# Patient Record
Sex: FEMALE | Employment: FULL TIME | URBAN - METROPOLITAN AREA
[De-identification: names, ages, dates, MRNs, and addresses within clinical notes are randomized per-mention and may not be internally consistent; named-entity substitution may affect disease eponyms.]

---

## 2024-03-02 ENCOUNTER — APPOINTMENT (EMERGENCY)
Dept: CT IMAGING | Facility: HOSPITAL | Age: 24
End: 2024-03-02
Payer: COMMERCIAL

## 2024-03-02 ENCOUNTER — HOSPITAL ENCOUNTER (EMERGENCY)
Facility: HOSPITAL | Age: 24
Discharge: HOME | End: 2024-03-02
Attending: EMERGENCY MEDICINE
Payer: COMMERCIAL

## 2024-03-02 VITALS
OXYGEN SATURATION: 100 % | RESPIRATION RATE: 18 BRPM | DIASTOLIC BLOOD PRESSURE: 65 MMHG | HEART RATE: 75 BPM | SYSTOLIC BLOOD PRESSURE: 114 MMHG
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
## 2024-03-02 DIAGNOSIS — S09.90XA MINOR HEAD INJURY, INITIAL ENCOUNTER: ICD-10-CM

## 2024-03-02 DIAGNOSIS — S16.1XXA STRAIN OF NECK MUSCLE, INITIAL ENCOUNTER: ICD-10-CM

## 2024-03-02 PROCEDURE — 72125 CT NECK SPINE W/O DYE: CPT

## 2024-03-02 PROCEDURE — 96372 THER/PROPH/DIAG INJ SC/IM: CPT

## 2024-03-02 PROCEDURE — 99284 EMERGENCY DEPT VISIT MOD MDM: CPT

## 2024-03-02 RX ORDER — NAPROXEN 500 MG/1
500 TABLET ORAL EVERY 12 HOURS PRN
Qty: 15 TABLET | Refills: 0 | Status: SHIPPED | OUTPATIENT
Start: 2024-03-02

## 2024-03-02 RX ORDER — KETOROLAC TROMETHAMINE 30 MG/ML
30 INJECTION, SOLUTION INTRAMUSCULAR; INTRAVENOUS ONCE
Status: COMPLETED | OUTPATIENT
Start: 2024-03-02 | End: 2024-03-02

## 2024-03-02 RX ADMIN — KETOROLAC TROMETHAMINE 30 MG: 30 INJECTION, SOLUTION INTRAMUSCULAR; INTRAVENOUS at 15:54
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
## 2024-03-02 NOTE — Clinical Note
Tamera Ely was seen and treated in our emergency department on 3/2/2024.                Diagnosis:     Tamera  .    She may return on this date: 03/04/2024         If you have any questions or concerns, please don't hesitate to call.      Nargis Nazario, JUAN DAVID    ______________________________           _______________          _______________  Hospital Representative                              Date                                Time

## 2024-10-05 ENCOUNTER — APPOINTMENT (EMERGENCY)
Dept: CT IMAGING | Facility: HOSPITAL | Age: 24
End: 2024-10-05

## 2024-10-05 ENCOUNTER — HOSPITAL ENCOUNTER (EMERGENCY)
Facility: HOSPITAL | Age: 24
Discharge: HOME | End: 2024-10-05
Attending: EMERGENCY MEDICINE

## 2024-10-05 VITALS
DIASTOLIC BLOOD PRESSURE: 59 MMHG | HEART RATE: 97 BPM | SYSTOLIC BLOOD PRESSURE: 129 MMHG | WEIGHT: 134 LBS | RESPIRATION RATE: 16 BRPM | TEMPERATURE: 98.8 F | OXYGEN SATURATION: 100 %

## 2024-10-05 DIAGNOSIS — Z97.5 PRESENCE OF IUD: ICD-10-CM

## 2024-10-05 DIAGNOSIS — R10.9 ABDOMINAL PAIN: ICD-10-CM

## 2024-10-05 DIAGNOSIS — N39.0 URINARY TRACT INFECTION: Primary | ICD-10-CM

## 2024-10-05 DIAGNOSIS — D64.9 ANEMIA: ICD-10-CM

## 2024-10-05 DIAGNOSIS — K59.00 CONSTIPATION: ICD-10-CM

## 2024-10-05 LAB
ALBUMIN SERPL BCG-MCNC: 4.1 G/DL (ref 3.5–5)
ALP SERPL-CCNC: 33 U/L (ref 34–104)
ALT SERPL W P-5'-P-CCNC: 13 U/L (ref 7–52)
ANION GAP SERPL CALCULATED.3IONS-SCNC: 7 MMOL/L (ref 4–13)
AST SERPL W P-5'-P-CCNC: 18 U/L (ref 13–39)
BACTERIA UR QL AUTO: ABNORMAL /HPF
BASOPHILS # BLD AUTO: 0.03 THOUSANDS/ΜL (ref 0–0.1)
BASOPHILS NFR BLD AUTO: 0 % (ref 0–1)
BILIRUB SERPL-MCNC: 0.2 MG/DL (ref 0.2–1)
BILIRUB UR QL STRIP: NEGATIVE
BUDDING YEAST: PRESENT
BUN SERPL-MCNC: 9 MG/DL (ref 5–25)
CALCIUM SERPL-MCNC: 9 MG/DL (ref 8.4–10.2)
CHLORIDE SERPL-SCNC: 105 MMOL/L (ref 96–108)
CLARITY UR: CLEAR
CO2 SERPL-SCNC: 26 MMOL/L (ref 21–32)
COLOR UR: YELLOW
CREAT SERPL-MCNC: 0.64 MG/DL (ref 0.6–1.3)
EOSINOPHIL # BLD AUTO: 0.01 THOUSAND/ΜL (ref 0–0.61)
EOSINOPHIL NFR BLD AUTO: 0 % (ref 0–6)
ERYTHROCYTE [DISTWIDTH] IN BLOOD BY AUTOMATED COUNT: 15.4 % (ref 11.6–15.1)
EXT PREGNANCY TEST URINE: NEGATIVE
EXT. CONTROL: NORMAL
GFR SERPL CREATININE-BSD FRML MDRD: 125 ML/MIN/1.73SQ M
GLUCOSE SERPL-MCNC: 103 MG/DL (ref 65–140)
GLUCOSE UR STRIP-MCNC: NEGATIVE MG/DL
HCT VFR BLD AUTO: 27 % (ref 34.8–46.1)
HGB BLD-MCNC: 8.2 G/DL (ref 11.5–15.4)
HGB UR QL STRIP.AUTO: ABNORMAL
IMM GRANULOCYTES # BLD AUTO: 0.03 THOUSAND/UL (ref 0–0.2)
IMM GRANULOCYTES NFR BLD AUTO: 0 % (ref 0–2)
KETONES UR STRIP-MCNC: NEGATIVE MG/DL
LEUKOCYTE ESTERASE UR QL STRIP: ABNORMAL
LYMPHOCYTES # BLD AUTO: 1.1 THOUSANDS/ΜL (ref 0.6–4.47)
LYMPHOCYTES NFR BLD AUTO: 13 % (ref 14–44)
MCH RBC QN AUTO: 24.6 PG (ref 26.8–34.3)
MCHC RBC AUTO-ENTMCNC: 30.4 G/DL (ref 31.4–37.4)
MCV RBC AUTO: 81 FL (ref 82–98)
MONOCYTES # BLD AUTO: 0.63 THOUSAND/ΜL (ref 0.17–1.22)
MONOCYTES NFR BLD AUTO: 7 % (ref 4–12)
NEUTROPHILS # BLD AUTO: 6.85 THOUSANDS/ΜL (ref 1.85–7.62)
NEUTS SEG NFR BLD AUTO: 80 % (ref 43–75)
NITRITE UR QL STRIP: NEGATIVE
NON-SQ EPI CELLS URNS QL MICRO: ABNORMAL /HPF
NRBC BLD AUTO-RTO: 0 /100 WBCS
PH UR STRIP.AUTO: 7.5 [PH] (ref 4.5–8)
PLATELET # BLD AUTO: 371 THOUSANDS/UL (ref 149–390)
PMV BLD AUTO: 10.9 FL (ref 8.9–12.7)
POTASSIUM SERPL-SCNC: 3.4 MMOL/L (ref 3.5–5.3)
PROT SERPL-MCNC: 7 G/DL (ref 6.4–8.4)
PROT UR STRIP-MCNC: ABNORMAL MG/DL
RBC # BLD AUTO: 3.33 MILLION/UL (ref 3.81–5.12)
RBC #/AREA URNS AUTO: ABNORMAL /HPF
SODIUM SERPL-SCNC: 138 MMOL/L (ref 135–147)
SP GR UR STRIP.AUTO: 1.02 (ref 1–1.03)
UROBILINOGEN UR QL STRIP.AUTO: 0.2 E.U./DL
WBC # BLD AUTO: 8.65 THOUSAND/UL (ref 4.31–10.16)
WBC #/AREA URNS AUTO: ABNORMAL /HPF

## 2024-10-05 PROCEDURE — 87186 SC STD MICRODIL/AGAR DIL: CPT

## 2024-10-05 PROCEDURE — 36415 COLL VENOUS BLD VENIPUNCTURE: CPT | Performed by: PHYSICIAN ASSISTANT

## 2024-10-05 PROCEDURE — 85025 COMPLETE CBC W/AUTO DIFF WBC: CPT | Performed by: PHYSICIAN ASSISTANT

## 2024-10-05 PROCEDURE — 87077 CULTURE AEROBIC IDENTIFY: CPT

## 2024-10-05 PROCEDURE — 99284 EMERGENCY DEPT VISIT MOD MDM: CPT

## 2024-10-05 PROCEDURE — 80053 COMPREHEN METABOLIC PANEL: CPT | Performed by: PHYSICIAN ASSISTANT

## 2024-10-05 PROCEDURE — 96374 THER/PROPH/DIAG INJ IV PUSH: CPT

## 2024-10-05 PROCEDURE — 81001 URINALYSIS AUTO W/SCOPE: CPT

## 2024-10-05 PROCEDURE — 99285 EMERGENCY DEPT VISIT HI MDM: CPT | Performed by: PHYSICIAN ASSISTANT

## 2024-10-05 PROCEDURE — 74177 CT ABD & PELVIS W/CONTRAST: CPT

## 2024-10-05 PROCEDURE — 81025 URINE PREGNANCY TEST: CPT | Performed by: PHYSICIAN ASSISTANT

## 2024-10-05 PROCEDURE — 87086 URINE CULTURE/COLONY COUNT: CPT

## 2024-10-05 RX ORDER — NITROFURANTOIN 25; 75 MG/1; MG/1
100 CAPSULE ORAL 2 TIMES DAILY
Qty: 10 CAPSULE | Refills: 0 | Status: SHIPPED | OUTPATIENT
Start: 2024-10-05

## 2024-10-05 RX ORDER — KETOROLAC TROMETHAMINE 30 MG/ML
15 INJECTION, SOLUTION INTRAMUSCULAR; INTRAVENOUS ONCE
Status: COMPLETED | OUTPATIENT
Start: 2024-10-05 | End: 2024-10-05

## 2024-10-05 RX ORDER — NITROFURANTOIN 25; 75 MG/1; MG/1
100 CAPSULE ORAL ONCE
Status: COMPLETED | OUTPATIENT
Start: 2024-10-05 | End: 2024-10-05

## 2024-10-05 RX ADMIN — IOHEXOL 100 ML: 350 INJECTION, SOLUTION INTRAVENOUS at 22:02

## 2024-10-05 RX ADMIN — NITROFURANTOIN MONOHYDRATE/MACROCRYSTALS 100 MG: 75; 25 CAPSULE ORAL at 22:45

## 2024-10-05 RX ADMIN — KETOROLAC TROMETHAMINE 15 MG: 30 INJECTION, SOLUTION INTRAMUSCULAR; INTRAVENOUS at 20:57

## 2024-10-06 NOTE — ED PROVIDER NOTES
Final diagnoses:   Urinary tract infection   Abdominal pain   Constipation   Anemia   Presence of IUD     ED Disposition       ED Disposition   Discharge    Condition   Stable    Date/Time   Sat Oct 5, 2024 10:32 PM    Comment   Tamera Ely discharge to home/self care.             Assessment & Plan       Medical Decision Making      Differential diagnosis including but not limited to: gastroenteritis, appendicitis, perforated viscus, splenic etiology, diverticulitis, internal hernia, constipation, pelvic pathology, renal colic, pyelonephritis, UTI.     Patient presenting to the ED for evaluation of left lower quadrant abdominal pain and dysuria.  Exam with mild tenderness to palpation of left lower quadrant without rebound or guarding.  Will order CBC for eval of anemia and leukocytosis, CMP for eval of LFTs, kidney function and electrolyte abnormalities. CT A/P for eval of acute intra-abd/pelv abnormalities. U preg for eval of pregnancy status. UA for evaluation of UTI and hematuria.  Toradol for pain control.    UA with innumerable WBCs and occasional bacteria.  There are also some budding yeast, pt denies symptoms of yeast infection, possibly from colonization vs improper cleaning prior to sample. Will treat with macrobid.  Anemia of 8.2 on CBC, pt aware of h/o anemia. Does not take iron, does have heavy menses. Recommend iron supplementation. Discussed s/sx that warrant ED return in regards to anemia.    CT w/ evidence of UTI. Also reviewed low-lying IUD, pt does not currently have OBGYN, will give referral for f/u and further outpatient workup if needed.    Prior to discharge, discharge instructions were discussed with patient at bedside. Patient was provided both verbal and written instructions. Patient is understanding of the discharge instructions and is agreeable to plan of care. Return precautions were discussed with patient bedside, patient verbalized understanding of signs and symptoms that would  necessitate return to the ED. All questions were answered. Patient was comfortable with the plan of care and discharged to home.     Dispo: discharge home with follow up to PCP. Patient stable, in no acute distress and non-toxic at discharge.    Problems Addressed:  Abdominal pain: acute illness or injury  Anemia: acute illness or injury  Constipation: acute illness or injury  Urinary tract infection: acute illness or injury    Amount and/or Complexity of Data Reviewed  Labs: ordered. Decision-making details documented in ED Course.  Radiology: ordered. Decision-making details documented in ED Course.    Risk  Prescription drug management.        ED Course as of 10/05/24 2322   Sat Oct 05, 2024   2049 Leukocytes, UA(!): Moderate   2058 PREGNANCY TEST URINE: Negative   2148 WBC, UA(!): Innumerable   2148 Budding Yeast: Present  Patient denies any symptoms of candidal infecion   2225 CT abdomen pelvis with contrast  IMPRESSION:     Mild diffuse urinary bladder wall thickening suspicious for a UTI. Correlation with a urinalysis is recommended.     Small amount of pelvic free fluid likely physiologic.     Low-lying intrauterine identified located at the lower uterine segment/cervical junction.     Subcentimeter left renal angiomyolipoma.       Medications   ketorolac (TORADOL) injection 15 mg (15 mg Intravenous Given 10/5/24 2057)   iohexol (OMNIPAQUE) 350 MG/ML injection (MULTI-DOSE) 100 mL (100 mL Intravenous Given 10/5/24 2202)   nitrofurantoin (MACROBID) extended-release capsule 100 mg (100 mg Oral Given 10/5/24 2245)       ED Risk Strat Scores                           SBIRT 22yo+      Flowsheet Row Most Recent Value   Initial Alcohol Screen: US AUDIT-C     1. How often do you have a drink containing alcohol? 0 Filed at: 10/05/2024 2059   2. How many drinks containing alcohol do you have on a typical day you are drinking?  0 Filed at: 10/05/2024 2059   3b. FEMALE Any Age, or MALE 65+: How often do you have 4 or  more drinks on one occassion? 0 Filed at: 10/05/2024 2059   Audit-C Score 0 Filed at: 10/05/2024 2059   DAVID: How many times in the past year have you...    Used an illegal drug or used a prescription medication for non-medical reasons? Never Filed at: 10/05/2024 2059                            History of Present Illness       Chief Complaint   Patient presents with    Abdominal Pain     Left lower abd pain and pain with urination since 2 this afternoon.        Past Medical History:   Diagnosis Date    Asthma       History reviewed. No pertinent surgical history.   History reviewed. No pertinent family history.   Social History     Tobacco Use    Smoking status: Never    Smokeless tobacco: Never   Substance Use Topics    Alcohol use: Not Currently    Drug use: Never      E-Cigarette/Vaping      E-Cigarette/Vaping Substances      I have reviewed and agree with the history as documented.     This is a 24-year-old female presenting to ED for evaluation of left lower quadrant abdominal pain.  She states that last week she was experiencing right lower quadrant abdominal pain and is now experiencing left lower quadrant pain.  She states that pain worsened since onset around 2 this afternoon.  She states that she also noted dysuria and pressure with urinating.  She denies any fevers or chills, nausea, diarrhea or constipation.  She denies any vaginal discharge, bleeding, irritation or itching. She reports having IUD and states she is unsure if it is in place correctly. Denies vaginal pain.      History provided by:  Patient   used: No        Review of Systems   Constitutional:  Negative for chills and fever.   Gastrointestinal:  Positive for abdominal pain. Negative for constipation, diarrhea, nausea and vomiting.   Genitourinary:  Positive for dysuria. Negative for flank pain, hematuria, urgency, vaginal bleeding, vaginal discharge and vaginal pain.   All other systems reviewed and are  negative.          Objective       ED Triage Vitals [10/05/24 2024]   Temperature Pulse Blood Pressure Respirations SpO2 Patient Position - Orthostatic VS   98.8 °F (37.1 °C) 97 129/59 16 100 % Sitting      Temp Source Heart Rate Source BP Location FiO2 (%) Pain Score    Oral Monitor Left arm -- 9      Vitals      Date and Time Temp Pulse SpO2 Resp BP Pain Score FACES Pain Rating User   10/05/24 2057 -- -- -- -- -- 7 -- SG   10/05/24 2024 98.8 °F (37.1 °C) 97 100 % 16 129/59 9 -- LAP            Physical Exam  Vitals reviewed.   Constitutional:       General: She is not in acute distress.     Appearance: Normal appearance. She is well-developed and well-groomed. She is not ill-appearing, toxic-appearing or diaphoretic.   HENT:      Head: Normocephalic and atraumatic.      Right Ear: External ear normal.      Left Ear: External ear normal.      Nose: Nose normal. No congestion or rhinorrhea.      Mouth/Throat:      Mouth: Mucous membranes are moist.      Pharynx: Oropharynx is clear. No oropharyngeal exudate or posterior oropharyngeal erythema.   Eyes:      General: No scleral icterus.        Right eye: No discharge.         Left eye: No discharge.      Extraocular Movements: Extraocular movements intact.      Conjunctiva/sclera: Conjunctivae normal.   Cardiovascular:      Rate and Rhythm: Normal rate and regular rhythm.      Pulses: Normal pulses.      Heart sounds: No murmur heard.     No friction rub. No gallop.   Pulmonary:      Effort: Pulmonary effort is normal. No respiratory distress.      Breath sounds: Normal breath sounds. No wheezing, rhonchi or rales.   Abdominal:      General: Abdomen is flat. There is no distension.      Palpations: Abdomen is soft.      Tenderness: There is abdominal tenderness in the left lower quadrant. There is no right CVA tenderness, left CVA tenderness, guarding or rebound.      Comments: Mild TTP LLQ pain, no rebound or guarding.   Musculoskeletal:         General: No deformity.  Normal range of motion.      Cervical back: Normal range of motion and neck supple.   Skin:     General: Skin is warm and dry.      Coloration: Skin is not jaundiced or pale.      Findings: No rash.   Neurological:      General: No focal deficit present.      Mental Status: She is alert.   Psychiatric:         Mood and Affect: Mood normal.         Behavior: Behavior normal. Behavior is cooperative.         Results Reviewed       Procedure Component Value Units Date/Time    Comprehensive metabolic panel [[id number]]  (Abnormal) Collected: 10/05/24 2056    Lab Status: Final result Specimen: Blood from Arm, Left Updated: 10/05/24 2118     Sodium 138 mmol/L      Potassium 3.4 mmol/L      Chloride 105 mmol/L      CO2 26 mmol/L      ANION GAP 7 mmol/L      BUN 9 mg/dL      Creatinine 0.64 mg/dL      Glucose 103 mg/dL      Calcium 9.0 mg/dL      AST 18 U/L      ALT 13 U/L      Alkaline Phosphatase 33 U/L      Total Protein 7.0 g/dL      Albumin 4.1 g/dL      Total Bilirubin 0.20 mg/dL      eGFR 125 ml/min/1.73sq m     Narrative:      National Kid[employer] Disease Foundation guidelines for Chronic Kidney Disease (CKD):     Stage 1 with normal or high GFR (GFR > 90 mL/min/1.73 square meters)    Stage 2 Mild CKD (GFR = 60-89 mL/min/1.73 square meters)    Stage 3A Moderate CKD (GFR = 45-59 mL/min/1.73 square meters)    Stage 3B Moderate CKD (GFR = 30-44 mL/min/1.73 square meters)    Stage 4 Severe CKD (GFR = 15-29 mL/min/1.73 square meters)    Stage 5 End Stage CKD (GFR <15 mL/min/1.73 square meters)  Note: GFR calculation is accurate only with a steady state creatinine    Urine Microscopic [[id number]]  (Abnormal) Collected: 10/05/24 2047    Lab Status: Final result Specimen: Urine, Clean Catch Updated: 10/05/24 2114     RBC, UA 20-30 /hpf      WBC, UA Innumerable /hpf      Epithelial Cells Occasional /hpf      Bacteria, UA Occasional /hpf      Budding Yeast Present    Urine culture [[id number]] Collected: 10/05/24 2047    Lab Status:  In process Specimen: Urine, Clean Catch Updated: 10/05/24 2114    CBC and differential [[id number]]  (Abnormal) Collected: 10/05/24 2056    Lab Status: Final result Specimen: Blood from Arm, Left Updated: 10/05/24 2103     WBC 8.65 Thousand/uL      RBC 3.33 Million/uL      Hemoglobin 8.2 g/dL      Hematocrit 27.0 %      MCV 81 fL      MCH 24.6 pg      MCHC 30.4 g/dL      RDW 15.4 %      MPV 10.9 fL      Platelets 371 Thousands/uL      nRBC 0 /100 WBCs      Segmented % 80 %      Immature Grans % 0 %      Lymphocytes % 13 %      Monocytes % 7 %      Eosinophils Relative 0 %      Basophils Relative 0 %      Absolute Neutrophils 6.85 Thousands/µL      Absolute Immature Grans 0.03 Thousand/uL      Absolute Lymphocytes 1.10 Thousands/µL      Absolute Monocytes 0.63 Thousand/µL      Eosinophils Absolute 0.01 Thousand/µL      Basophils Absolute 0.03 Thousands/µL     POCT pregnancy, urine [[id number]]  (Normal) Resulted: 10/05/24 2052    Lab Status: Final result Updated: 10/05/24 2052     EXT Preg Test, Ur Negative     Control Valid    Urine Macroscopic, POC [[id number]]  (Abnormal) Collected: 10/05/24 2047    Lab Status: Final result Specimen: Urine Updated: 10/05/24 2049     Color, UA Yellow     Clarity, UA Clear     pH, UA 7.5     Leukocytes, UA Moderate     Nitrite, UA Negative     Protein,  (2+) mg/dl      Glucose, UA Negative mg/dl      Ketones, UA Negative mg/dl      Urobilinogen, UA 0.2 E.U./dl      Bilirubin, UA Negative     Occult Blood, UA Small     Specific Gravity, UA 1.020    Narrative:      [facility] RESULT            CT abdomen pelvis with contrast   Final Interpretation by Jarred Benjamin MD (10/05 2217)      Mild diffuse urinary bladder wall thickening suspicious for a UTI. Correlation with a urinalysis is recommended.      Small amount of pelvic free fluid likely physiologic.      Low-lying intrauterine identified located at the lower uterine segment/cervical junction.      Subcentimeter left renal  angiomyolipoma.         Workstation performed: [id number]             Procedures    ED Medication and Procedure Management   Prior to Admission Medications   Prescriptions Last Dose Informant Patient Reported? Taking?   naproxen (NAPROSYN) 500 mg tablet   No No   Sig: Take 1 tablet (500 mg total) by mouth every 12 (twelve) hours as needed for moderate pain, headaches or mild pain      Facility-Administered Medications: None     Discharge Medication List as of 10/5/2024 10:44 PM        START taking these medications    Details   nitrofurantoin (MACROBID) 100 mg capsule Take 1 capsule (100 mg total) by mouth 2 (two) times a day, Starting Sat 10/5/2024, Normal           CONTINUE these medications which have NOT CHANGED    Details   naproxen (NAPROSYN) 500 mg tablet Take 1 tablet (500 mg total) by mouth every 12 (twelve) hours as needed for moderate pain, headaches or mild pain, Starting Sat 3/2/2024, Normal             ED SEPSIS DOCUMENTATION   Time reflects when diagnosis was documented in both MDM as applicable and the Disposition within this note       Time User Action Codes Description Comment    10/5/2024 10:33 PM Liana Palmer [N39.0] Urinary tract infection     10/5/2024 10:34 PM Liana Palmer [R10.9] Abdominal pain     10/5/2024 10:34 PM Liana Palmer [K59.00] Constipation     10/5/2024 10:40 PM Liana Palmer Add [D64.9] Anemia     10/5/2024 11:02 PM Liana Palmer [Z97.5] Presence of IUD                  Liana Palmer PA-C  10/05/24 2931

## 2024-10-08 LAB — BACTERIA UR CULT: ABNORMAL

## 2024-11-20 ENCOUNTER — OFFICE VISIT (OUTPATIENT)
Dept: OBGYN CLINIC | Facility: CLINIC | Age: 24
End: 2024-11-20

## 2024-11-20 VITALS — WEIGHT: 139 LBS | HEART RATE: 74 BPM | SYSTOLIC BLOOD PRESSURE: 104 MMHG | DIASTOLIC BLOOD PRESSURE: 68 MMHG

## 2024-11-20 DIAGNOSIS — Z59.12 INADEQUATE HOUSING UTILITIES: ICD-10-CM

## 2024-11-20 DIAGNOSIS — Z30.431 IUD CHECK UP: ICD-10-CM

## 2024-11-20 DIAGNOSIS — Z59.9 FINANCIAL DIFFICULTIES: ICD-10-CM

## 2024-11-20 DIAGNOSIS — Z59.41 FOOD INSECURITY: ICD-10-CM

## 2024-11-20 DIAGNOSIS — Z59.819 HOUSING INSTABILITY: ICD-10-CM

## 2024-11-20 DIAGNOSIS — Z59.82 INABILITY TO ACQUIRE TRANSPORTATION: ICD-10-CM

## 2024-11-20 DIAGNOSIS — T83.32XA INTRAUTERINE CONTRACEPTIVE DEVICE THREADS LOST, INITIAL ENCOUNTER: Primary | ICD-10-CM

## 2024-11-20 PROCEDURE — 99203 OFFICE O/P NEW LOW 30 MIN: CPT | Performed by: NURSE PRACTITIONER

## 2024-11-20 RX ORDER — COPPER 313.4 MG/1
1 INTRAUTERINE DEVICE INTRAUTERINE
COMMUNITY

## 2024-11-20 SDOH — ECONOMIC STABILITY - INCOME SECURITY: PROBLEM RELATED TO HOUSING AND ECONOMIC CIRCUMSTANCES, UNSPECIFIED: Z59.9

## 2024-11-20 SDOH — ECONOMIC STABILITY - TRANSPORTATION SECURITY: TRANSPORTATION INSECURITY: Z59.82

## 2024-11-20 SDOH — ECONOMIC STABILITY - HOUSING INSECURITY: INADEQUATE HOUSING UTILITIES: Z59.12

## 2024-11-20 SDOH — ECONOMIC STABILITY - HOUSING INSECURITY: HOUSING INSTABILITY UNSPECIFIED: Z59.819

## 2024-11-20 SDOH — ECONOMIC STABILITY - FOOD INSECURITY: FOOD INSECURITY: Z59.41

## 2024-11-20 NOTE — PATIENT INSTRUCTIONS
Thank you for your confidence in our team.   We appreciate you and welcome your feedback.   If you receive a survey from us, please take a few moments to let us know how we are doing.   Sincerely,  MARCELA Reaves

## 2024-11-20 NOTE — PROGRESS NOTES
PROBLEM GYNECOLOGICAL VISIT    Tamera Ely is a 24 y.o. female who presents today with complaint of discomfort from IUD.  Her general medical history has been reviewed and she reports it as follows:    Past Medical History:   Diagnosis Date    Asthma      Past Surgical History:   Procedure Laterality Date    NO PAST SURGERIES       OB History          0    Para   0    Term   0       0    AB   0    Living   0         SAB   0    IAB   0    Ectopic   0    Multiple   0    Live Births   0               Social History     Tobacco Use    Smoking status: Former     Types: Cigarettes    Smokeless tobacco: Never    Tobacco comments:     Quit    Vaping Use    Vaping status: Every Day    Substances: Nicotine, Flavoring   Substance Use Topics    Alcohol use: Yes     Comment: couple times/month    Drug use: Yes     Types: Marijuana     Comment: last use 2024     Social History     Substance and Sexual Activity   Sexual Activity Yes    Partners: Male, Female    Birth control/protection: I.U.D.       Current Outpatient Medications   Medication Instructions    intrauterine copper (PARAGARD) IUD 1 each, Once every 10 years - IUD       History of Present Illness:   Reports copper IUD inserted in [place] 5 years ago and she was advised that it was only effective for 5 years.  She reports that for the past 3 months she and her partner have been experiencing discomfort from the IUD strings during sex and it has felt to her like the IUD strings are longer than usual.  She is concerned that the IUD may be malpositioned.    Review of Systems:  Review of Systems   Constitutional: Negative.    Gastrointestinal: Negative.    Genitourinary:  Positive for dyspareunia (from IUD).        IUD strings feel longer than usual       Physical Exam:  /68 (BP Location: Left arm, Patient Position: Sitting, Cuff Size: Standard)   Pulse 74   Wt 63 kg (139 lb)   LMP 2024 (Approximate)   Physical  Exam  Constitutional:       General: She is not in acute distress.  Genitourinary:      Vulva exam comments: Normal.      No vaginal discharge or erythema.      No cervical lesion.      No IUD strings visualized.   Abdominal:      Palpations: Abdomen is soft.      Tenderness: There is no abdominal tenderness.   Neurological:      Mental Status: She is alert.   Skin:     General: Skin is warm and dry.   Vitals reviewed.       Assessment:   1. No IUD strings noted.    Plan:   1. Imaging ordered: pelvic ultrasound.   2. Return to office 1 week after ultrasound performed to review results.   3. Patient's depression screening was assessed with a PHQ-2 score of 2. Clinically patient does not have depression. No treatment is required.

## 2024-11-21 ENCOUNTER — PATIENT OUTREACH (OUTPATIENT)
Dept: OBGYN CLINIC | Facility: CLINIC | Age: 24
End: 2024-11-21

## 2024-11-21 NOTE — PROGRESS NOTES
DANNIELLE TORRES attempted to contact pt. Pt did not answer. DANNIELLE TORRES left a voicemail for a return call and will try again at another time.

## 2024-11-25 ENCOUNTER — PATIENT OUTREACH (OUTPATIENT)
Dept: OBGYN CLINIC | Facility: CLINIC | Age: 24
End: 2024-11-25

## 2024-11-25 NOTE — LETTER
[address] [place] [address]  BERTHA ZIEGLER [address]  [phone]    Re: financial difficulties   11/25/2024       Dear Tamera,    We tried to reach you by phone on 11/25/24 and was unfortunately unable to reach you.  It is important that you contact the [facility] as soon as possible at: [phone].    Sincerely,         Marie Guerin

## 2024-11-25 NOTE — PROGRESS NOTES
DANNIELLE TORRES attempted to contact pt x2. Pt did not answer. DANNIELLE TORRES left a voicemail for a return call. DANNIELLE TORRES has mailed an unable to reach letter to pt address on file. DANNIELLE TORRES will otherwise close this referral at this time.